# Patient Record
Sex: FEMALE
[De-identification: names, ages, dates, MRNs, and addresses within clinical notes are randomized per-mention and may not be internally consistent; named-entity substitution may affect disease eponyms.]

---

## 2018-01-05 ENCOUNTER — HOSPITAL ENCOUNTER (EMERGENCY)
Dept: HOSPITAL 31 - C.ER | Age: 48
Discharge: HOME | End: 2018-01-05
Payer: COMMERCIAL

## 2018-01-05 VITALS
HEART RATE: 76 BPM | SYSTOLIC BLOOD PRESSURE: 114 MMHG | TEMPERATURE: 98.7 F | DIASTOLIC BLOOD PRESSURE: 81 MMHG | RESPIRATION RATE: 18 BRPM

## 2018-01-05 VITALS — OXYGEN SATURATION: 100 %

## 2018-01-05 VITALS — BODY MASS INDEX: 24.9 KG/M2

## 2018-01-05 DIAGNOSIS — J45.909: Primary | ICD-10-CM

## 2018-01-05 NOTE — C.PDOC
History Of Present Illness


47 yr old female with PMHx of asthma, presents to the ER with complaints of 

coughing for the past 3 days, associated with subjective fever and runny nose. 

Patient states she has been using her nebulizer with no relief. States she felt 

more SOB prompting the visit. Denies travel, chest pain, nausea, vomiting, 

abdominal pain, neck pain or headache.


Time Seen by Provider: 01/05/18 07:21


Chief Complaint (Nursing): Cough, Cold, Congestion


History Per: Patient


History/Exam Limitations: no limitations


Onset/Duration Of Symptoms: Days (3)


Current Symptoms Are (Timing): Still Present





Past Medical History


Reviewed: Historical Data, Nursing Documentation, Vital Signs


Vital Signs: 


 Last Vital Signs











Temp  98.7 F   01/05/18 09:32


 


Pulse  76   01/05/18 09:32


 


Resp  18   01/05/18 09:32


 


BP  114/81   01/05/18 09:32


 


Pulse Ox  100   01/05/18 09:32














- Medical History


PMH: Asthma (DOES NOT USE INHALER OR TAKE MEDS), Diverticulitis, Gastritis, HTN

, Hypercholesterolemia, Osteoporosis


Surgical History: Endoscopy





- [facility] Procedures








ANESTH INJEC PERIPH NERV (03/04/15)


CYSTOSCOPY NEC (03/04/15)


IMPLANT OR REPLACEMENT OF PERIPHERAL NEUROSTIMULATOR LEAD(S) (06/11/15)


INJECT/INFUSE NEC (07/29/14)


INSERT OR REPLACE OF OTH NEUROSTIMULATOR PULSE GENERATOR (06/11/15)


OPEN ROBOTIC ASSISTED PROCEDURE (03/04/15)


OTH LYSIS-PERITONEAL ADHES (03/04/15)


OTHER AND UNSPECIFIED TOTAL ABDOMINAL HYSTERECTOMY (03/04/15)


REMOVE BOTH FALLOP TUBES (03/04/15)


SUTURE BLADDER LACERAT (03/04/15)


UTERINE SUSPENSION NEC (03/04/15)








Family History: States: No Known Family Hx





- Social History


Hx Tobacco Use: No


Hx Alcohol Use: Yes


Hx Substance Use: No





- Immunization History


Hx Tetanus Toxoid Vaccination: No


Hx Influenza Vaccination: Yes


Hx Pneumococcal Vaccination: No





Review Of Systems


Except As Marked, All Systems Reviewed And Found Negative.


Constitutional: Positive for: Fever (subjective)


ENT: Positive for: Nose Discharge (runny nose)


Cardiovascular: Negative for: Chest Pain


Respiratory: Positive for: Cough, Shortness of Breath


Gastrointestinal: Negative for: Nausea, Vomiting, Abdominal Pain


Musculoskeletal: Negative for: Neck Pain


Neurological: Negative for: Headache





Physical Exam





- Physical Exam


Appears: Non-toxic, No Acute Distress


Skin: Warm, Dry, No Rash


Head: Atraumatic, Normacephalic


Eye(s): bilateral: Normal Inspection, PERRL, EOMI


Ear(s): Bilateral: Normal


Oral Mucosa: Moist


Throat: Normal, No Erythema, No Exudate, No Drooling


Neck: Normal, Normal ROM, Supple


Cardiovascular: Rhythm Regular, No Friction Rub, No Murmur


Respiratory: Normal Breath Sounds, No Rales, No Rhonchi, No Stridor, No Wheezing

, Other (constant coughing, no retractions)


Gastrointestinal/Abdominal: Normal Exam, Soft, No Tenderness


Extremity: Normal ROM, No Swelling


Neurological/Psych: Oriented x3, Normal Speech, Normal Motor


Gait: Steady





ED Course And Treatment


O2 Sat by Pulse Oximetry: 100 (RA)


Pulse Ox Interpretation: Normal





- Other Rad


  ** CXR


X-Ray: Viewed By Me, Read By Radiologist


Interpretation: HISTORY:  COMPARISON:  01/05/2018 please.  TECHNIQUE:  Chest PA 

and lateral.  FINDINGS:  LINES AND TUBES:  None.  LUNG AND PLEURA:  The lungs 

are well inflated and clear.  HEART AND MEDIASTINUM:  The heart is not 

enlarged. The hilar and mediastinal contours are within normal limits.  

SKELETAL STRUCTURES:  The bony structures are within normal limits for the 

patient's age.  VISUALIZED UPPER ABDOMEN:  Normal.  OTHER FINDINGS:  None.  

IMPRESSION:  No active pulmonary disease.





Medical Decision Making


Medical Decision Making: 


PLAN:


* CXR


* Albuterol INH


* Motrin PO 


* Prednisone PO 


* Phenergan PO 





NOTE: 


On re-exam, the patient reports improvement of symptoms. Lungs are CTA, heart 

is RRR, vitals are WNL's. Ambulatory in the ED with steady gait. 





Disposition





- Disposition


Referrals: 


[facility] [Outside]


Disposition: HOME/ ROUTINE


Disposition Time: 09:02


Condition: GOOD


Additional Instructions: 


Follow up with the medical doctor within 1-2 days. Return if worsened. 


Prescriptions: 


Benzonatate [Tessalon Perles] 200 mg PO TID PRN #21 sgl


 PRN Reason: Cough


Ibuprofen [Motrin] 1 tab PO TID PRN #30 tab


 PRN Reason: Pain


predniSONE [Prednisone] 20 mg PO BID #10 tab


Instructions:  Acute Bronchitis (ED)


Forms:  [facility] (English), Work Excuse


Print Language: Paraguayan





- Clinical Impression


Clinical Impression: 


 Bronchitis, Asthmatic bronchitis








- PA / NP / Resident Statement


MD/DO has reviewed & agrees with the documentation as recorded.





- Scribe Statement


The provider has reviewed the documentation as recorded by the Scribe


Lisa Mera





All medical record entries made by the Scribe were at my direction and 

personally dictated by me. I have reviewed the chart and agree that the record 

accurately reflects my personal performance of the history, physical exam, 

medical decision making, and the department course for this patient. I have 

also personally directed, reviewed, and agree with the discharge instructions 

and disposition.

## 2018-01-05 NOTE — RAD
HISTORY:



COMPARISON:

01/05/2018 please.



TECHNIQUE:

Chest PA and lateral



FINDINGS:



LINES AND TUBES:

None. 



LUNG AND PLEURA:

The lungs are well inflated and clear. 



HEART AND MEDIASTINUM:

The heart is not enlarged. The hilar and mediastinal contours are 

within normal limits.



SKELETAL STRUCTURES:

The bony structures are within normal limits for the patient's age.



VISUALIZED UPPER ABDOMEN:

Normal.



OTHER FINDINGS:

None.



IMPRESSION:

No active pulmonary disease.

## 2018-07-11 ENCOUNTER — HOSPITAL ENCOUNTER (EMERGENCY)
Dept: HOSPITAL 31 - C.ER | Age: 48
Discharge: HOME | End: 2018-07-11
Payer: COMMERCIAL

## 2018-07-11 VITALS — HEART RATE: 88 BPM | TEMPERATURE: 98.3 F | SYSTOLIC BLOOD PRESSURE: 106 MMHG | DIASTOLIC BLOOD PRESSURE: 78 MMHG

## 2018-07-11 VITALS — OXYGEN SATURATION: 100 %

## 2018-07-11 VITALS — BODY MASS INDEX: 25 KG/M2

## 2018-07-11 VITALS — RESPIRATION RATE: 18 BRPM

## 2018-07-11 DIAGNOSIS — K57.32: Primary | ICD-10-CM

## 2018-07-11 LAB
ALBUMIN SERPL-MCNC: 4.5 G/DL (ref 3.5–5)
ALBUMIN/GLOB SERPL: 1.3 {RATIO} (ref 1–2.1)
ALT SERPL-CCNC: 27 U/L (ref 9–52)
AST SERPL-CCNC: 25 U/L (ref 14–36)
BASOPHILS # BLD AUTO: 0 K/UL (ref 0–0.2)
BASOPHILS NFR BLD: 0.3 % (ref 0–2)
BILIRUB UR-MCNC: NEGATIVE MG/DL
BUN SERPL-MCNC: 9 MG/DL (ref 7–17)
CALCIUM SERPL-MCNC: 10.1 MG/DL (ref 8.6–10.4)
EOSINOPHIL # BLD AUTO: 0.1 K/UL (ref 0–0.7)
EOSINOPHIL NFR BLD: 0.9 % (ref 0–4)
ERYTHROCYTE [DISTWIDTH] IN BLOOD BY AUTOMATED COUNT: 13.1 % (ref 11.5–14.5)
GFR NON-AFRICAN AMERICAN: > 60
GLUCOSE UR STRIP-MCNC: NORMAL MG/DL
HGB BLD-MCNC: 12 G/DL (ref 11–16)
LEUKOCYTE ESTERASE UR-ACNC: (no result) LEU/UL
LIPASE: 100 U/L (ref 23–300)
LYMPHOCYTES # BLD AUTO: 3.1 K/UL (ref 1–4.3)
LYMPHOCYTES NFR BLD AUTO: 29 % (ref 20–40)
MCH RBC QN AUTO: 31 PG (ref 27–31)
MCHC RBC AUTO-ENTMCNC: 33.9 G/DL (ref 33–37)
MCV RBC AUTO: 91.6 FL (ref 81–99)
MONOCYTES # BLD: 1.2 K/UL (ref 0–0.8)
MONOCYTES NFR BLD: 10.9 % (ref 0–10)
NEUTROPHILS # BLD: 6.3 K/UL (ref 1.8–7)
NEUTROPHILS NFR BLD AUTO: 58.9 % (ref 50–75)
NRBC BLD AUTO-RTO: 0 % (ref 0–2)
PH UR STRIP: 6 [PH] (ref 5–8)
PLATELET # BLD: 309 K/UL (ref 130–400)
PMV BLD AUTO: 7.9 FL (ref 7.2–11.7)
PROT UR STRIP-MCNC: NEGATIVE MG/DL
RBC # BLD AUTO: 3.86 MIL/UL (ref 3.8–5.2)
RBC # UR STRIP: NEGATIVE /UL
SP GR UR STRIP: 1 (ref 1–1.03)
SQUAMOUS EPITHIAL: < 1 /HPF (ref 0–5)
UROBILINOGEN UR-MCNC: NORMAL MG/DL (ref 0.2–1)
WBC # BLD AUTO: 10.7 K/UL (ref 4.8–10.8)

## 2018-07-11 PROCEDURE — 96374 THER/PROPH/DIAG INJ IV PUSH: CPT

## 2018-07-11 PROCEDURE — 81001 URINALYSIS AUTO W/SCOPE: CPT

## 2018-07-11 PROCEDURE — 83690 ASSAY OF LIPASE: CPT

## 2018-07-11 PROCEDURE — 80053 COMPREHEN METABOLIC PANEL: CPT

## 2018-07-11 PROCEDURE — 74177 CT ABD & PELVIS W/CONTRAST: CPT

## 2018-07-11 PROCEDURE — 99285 EMERGENCY DEPT VISIT HI MDM: CPT

## 2018-07-11 PROCEDURE — 85025 COMPLETE CBC W/AUTO DIFF WBC: CPT

## 2018-07-11 NOTE — C.PDOC
History Of Present Illness


47-year-old female presents to the ER for evaluation of abdominal pain 

associated with multiple episodes of watery non-bloody diarrhea that began 

yesterday. Pain is described as generalized and crampy in nature. No associated 

nausea or vomiting. She does report loss of appetite. Otherwise patient denies 

any fever, chills, dysuria, frequency, back pain, or other associated symptoms. 

Patient states pain and discomfort continued today, prompting concern.





Time Seen by Provider: 07/11/18 18:57


Chief Complaint (Nursing): Abdominal Pain


History Per: Patient


History/Exam Limitations: no limitations


Onset/Duration Of Symptoms: Days (x2)


Current Symptoms Are (Timing): Still Present





Past Medical History


Reviewed: Historical Data, Nursing Documentation, Vital Signs


Vital Signs: 


 Last Vital Signs











Temp  98.3 F   07/11/18 21:25


 


Pulse  88   07/11/18 21:25


 


Resp  18   07/11/18 21:25


 


BP  106/78   07/11/18 21:25


 


Pulse Ox  100   07/11/18 21:58














- Medical History


PMH: Asthma (DOES NOT USE INHALER OR TAKE MEDS), Diverticulitis, Gastritis, HTN

, Hypercholesterolemia, Osteoporosis


   Denies: Colonic Polyps, Chronic Kidney Disease


Surgical History: Endoscopy





- [facility] Procedures








ANESTH INJEC PERIPH NERV (03/04/15)


CYSTOSCOPY NEC (03/04/15)


IMPLANT OR REPLACEMENT OF PERIPHERAL NEUROSTIMULATOR LEAD(S) (06/11/15)


INJECT/INFUSE NEC (07/29/14)


INSERT OR REPLACE OF OTH NEUROSTIMULATOR PULSE GENERATOR (06/11/15)


OPEN ROBOTIC ASSISTED PROCEDURE (03/04/15)


OTH LYSIS-PERITONEAL ADHES (03/04/15)


OTHER AND UNSPECIFIED TOTAL ABDOMINAL HYSTERECTOMY (03/04/15)


REMOVE BOTH FALLOP TUBES (03/04/15)


SUTURE BLADDER LACERAT (03/04/15)


UTERINE SUSPENSION NEC (03/04/15)








Family History: States: Unknown Family Hx





- Social History


Hx Tobacco Use: No


Hx Alcohol Use: Yes


Hx Substance Use: No





- Immunization History


Hx Tetanus Toxoid Vaccination: No


Hx Influenza Vaccination: Yes


Hx Pneumococcal Vaccination: No





Review Of Systems


Except As Marked, All Systems Reviewed And Found Negative.


Constitutional: Negative for: Fever, Chills


Gastrointestinal: Positive for: Abdominal Pain, Diarrhea, Other (Loss of 

appetite).  Negative for: Nausea, Vomiting


Genitourinary: Negative for: Dysuria, Frequency


Musculoskeletal: Negative for: Back Pain





Physical Exam





- Physical Exam


Appears: No Acute Distress, Other (Appears uncomfortable)


Skin: Normal Color, Warm, Dry


Head: Atraumatic, Normacephalic


Eye(s): bilateral: Normal Inspection, PERRL, EOMI


Nose: Normal


Oral Mucosa: Moist


Neck: Normal ROM, Supple


Chest: Symmetrical


Cardiovascular: Rhythm Regular, No Murmur


Respiratory: Normal Breath Sounds, No Accessory Muscle Use


Gastrointestinal/Abdominal: Soft, Tenderness (mild tenderness to the RUQ and 

epigastrium, with increased tenderness of the RLQ with guarding), Guarding (RLQ)

, No Rebound


Back: Normal Inspection, No CVA Tenderness


Extremity: Bilateral: Atraumatic, Normal Color And Temperature, Normal ROM


Neurological/Psych: Oriented x3, Normal Speech, Normal Cranial Nerves





ED Course And Treatment





- Laboratory Results


Result Diagrams: 


 07/11/18 19:14





 07/11/18 19:14


Lab Interpretation: No Acute Changes


O2 Sat by Pulse Oximetry: 100 (RA)


Pulse Ox Interpretation: Normal





- CT Scan/US


  ** CT abd/pel


Other Rad Studies (CT/US): Read By Radiologist, Radiology Report Reviewed


CT/US Interpretation: EXAM:  CT Abdomen and Pelvis With Intravenous Contrast.  

EXAM DATE/TIME:  7/11/2018 7:01 PM.  CLINICAL HISTORY:  47 years old, female; 

Pain; Abdominal pain; Generalized; Additional info: Abd pain.  TECHNIQUE:  

Axial computed tomography images of the abdomen and pelvis with intravenous 

contrast.  All CT scans at this facility use at least one of these dose 

optimization techniques: automated.  exposure control; mA and/or kV adjustment 

per patient size (includes targeted exams where dose is.  matched to clinical 

indication); or iterative reconstruction.  Coronal and sagittal reformatted 

images were created and reviewed.  CONTRAST:  100 ml of OMNIPAQUE 300 

administered intravenously.  COMPARISON:  CT - ABD PELVIS IV CONTRAST ONLY 2016- 03-16 20:55.  FINDINGS:  Limitations: Motion artifact - mild to moderate.  

Lower thorax: No acute findings.  ABDOMEN:  Liver: Fatty infiltration.  

Gallbladder and bile ducts: No calcified stones. No ductal dilation.  Pancreas: 

Normal. No ductal dilation.  Spleen: No splenomegaly.  Adrenals: No mass.  

Kidneys and ureters: Normal. No hydronephrosis.  Stomach and bowel: Few 

scattered diverticula within colon. Mild mural thickening short segment of.  

cecum. Mild stranding within adjacent fat. No obstruction.  Appendix: Normal 

caliber. No inflammation.  PELVIS:  Bladder: Unremarkable as visualized.  

Reproductive: Hysterectomy.  ABDOMEN and PELVIS:  Intraperitoneal space: 

Normal. No free air. No significant fluid collection.  Bones/joints: No acute 

fracture. No dislocation.  Soft tissues: Breast implants.  Vasculature: Minimal 

atherosclerotic disease. No aneurysm.  Lymph nodes: No enlarged lymph nodes.  

IMPRESSION:  Findings compatible with acute diverticulitis of cecum. Recommend 

endoscopy following resolution.


Reevaluation Time: 22:01


Reassessment Condition: Improved





Medical Decision Making


Medical Decision Making: 


Initial Impression: Right flank pain





Time: 19:01


Initial Plan:


--Blood work


--Urinalysis


--2 mg IV Morphine


--CT A/P with PO & IV contrast








Disposition


Counseled Patient/Family Regarding: Studies Performed, Diagnosis, Need For 

Followup, Rx Given





- Disposition


Referrals: 


[facility] [Outside]


Disposition: HOME/ ROUTINE


Disposition Time: 22:04


Condition: STABLE


Prescriptions: 


Ciprofloxacin [Cipro] 1 tab PO BID #14 tab


Instructions:  Low Fiber Diet, Diverticulitis


Forms:  [facility] (Vietnamese)


Print Language: Trinidadian





- Clinical Impression


Clinical Impression: 


 Diverticulitis








- Scribe Statement


The provider has reviewed the documentation as recorded by the Isaias Mensah





Provider Attestation: 





All medical record entries made by the Isaias were at my direction and 

personally dictated by me. I have reviewed the chart and agree that the record 

accurately reflects my personal performance of the history, physical exam, 

medical decision making, and the department course for this patient. I have 

also personally directed, reviewed, and agree with the discharge instructions 

and disposition.

## 2018-07-12 NOTE — CT
Date of service: 



07/11/2018



PROCEDURE:  CT Abdomen and Pelvis without intravenous contrast



HISTORY:

Abdominal pain 



COMPARISON:

CT abdomen and pelvis dated 03/16/2016



TECHNIQUE:

Multiple contiguous axial images were performed through the abdomen 

and pelvis with the use of intravenous contrast.  Subsequently, 

sagittal and coronal reformatted images were. 



Radiation dose:



Total exam DLP = 326 mGy-cm.



This CT exam was performed using one or more of the following dose 

reduction techniques: Automated exposure control, adjustment of the 

mA and/or kV according to patient size, and/or use of iterative 

reconstruction technique.



FINDINGS:



LOWER THORAX:

Atelectasis at the lung bases. 



LIVER:

Fatty infiltration of the liver. 



GALLBLADDER AND BILE DUCTS:

Unremarkable. 



PANCREAS:

Unremarkable. No gross lesion or ductal dilatation.



SPLEEN:

Unremarkable. 



ADRENALS:

Unremarkable. No mass. 



KIDNEYS AND URETERS:

Unremarkable. No hydronephrosis. No solid mass. 



VASCULATURE:

Atherosclerotic disease in the aorta. 



BOWEL:

Few scattered diverticuli within the colon.  Mild mural thickening of 

a short segment of the cecum.  Mild stranding in the adjacent fat.



APPENDIX:

Unremarkable. Normal appendix. 



PERITONEUM:

Unremarkable. No free fluid. No free air. 



LYMPH NODES:

Unremarkable. No enlarged lymph nodes. 



BLADDER:

Unremarkable. 



REPRODUCTIVE:

Hysterectomy. 



BONES:

No acute fracture. 



OTHER FINDINGS:

Mild to moderate motion artifact. Breast implants. 



IMPRESSION:

Findings concerning for acute diverticulitis of the cecum. Clinical 

correlation.  Recommend endoscopy following resolution.



Additional findings as above.



These findings were preliminarily reported at 9:53 p.m. on 07/11/2018 

by Dr. Max Villanueva from [facility].

## 2018-09-12 ENCOUNTER — HOSPITAL ENCOUNTER (EMERGENCY)
Dept: HOSPITAL 31 - C.ER | Age: 48
Discharge: HOME | End: 2018-09-12
Payer: COMMERCIAL

## 2018-09-12 VITALS
HEART RATE: 66 BPM | SYSTOLIC BLOOD PRESSURE: 109 MMHG | OXYGEN SATURATION: 100 % | DIASTOLIC BLOOD PRESSURE: 75 MMHG | TEMPERATURE: 98.8 F | RESPIRATION RATE: 18 BRPM

## 2018-09-12 VITALS — BODY MASS INDEX: 25 KG/M2
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
## 2018-09-12 DIAGNOSIS — S91.311A: Primary | ICD-10-CM

## 2018-09-12 NOTE — C.PDOC
History Of Present Illness





48 yo female come in accompanied by daughter for evaluation of Right foot 

laceration sustained early today around 5PM "walking outside by garbage". Pt 

admits, cleaned wound at home," noted still bleeding" . Otherwise, pt denies 

fever, chills, deformity to Right foot, weakness, sensory or vascular deficits. 

Ambulate to Ed for evaluation, not in any apparent distress.


Time Seen by Provider: 09/12/18 21:39


History Per: Patient


Onset/Duration Of Symptoms: Sudden Onset





Past Medical History


Reviewed: Historical Data, Nursing Documentation, Vital Signs


Vital Signs: 


 Last Vital Signs











Temp  98.8 F   09/12/18 22:12


 


Pulse  66   09/12/18 22:12


 


Resp  18   09/12/18 22:12


 


BP  109/75   09/12/18 22:12


 


Pulse Ox  100   09/12/18 22:12














- Medical History


PMH: Asthma (DOES NOT USE INHALER OR TAKE MEDS), Diverticulitis, Gastritis, HTN

, Hypercholesterolemia, Osteoporosis


   Denies: Colonic Polyps, Chronic Kidney Disease


Surgical History: Endoscopy





- [facility] Procedures








ANESTH INJEC PERIPH NERV (03/04/15)


CYSTOSCOPY NEC (03/04/15)


IMPLANT OR REPLACEMENT OF PERIPHERAL NEUROSTIMULATOR LEAD(S) (06/11/15)


INJECT/INFUSE NEC (07/29/14)


INSERT OR REPLACE OF OTH NEUROSTIMULATOR PULSE GENERATOR (06/11/15)


OPEN ROBOTIC ASSISTED PROCEDURE (03/04/15)


OTH LYSIS-PERITONEAL ADHES (03/04/15)


OTHER AND UNSPECIFIED TOTAL ABDOMINAL HYSTERECTOMY (03/04/15)


REMOVE BOTH FALLOP TUBES (03/04/15)


SUTURE BLADDER LACERAT (03/04/15)


UTERINE SUSPENSION NEC (03/04/15)








Family History: States: Unknown Family Hx





- Social History


Hx Tobacco Use: No


Hx Alcohol Use: Yes


Hx Substance Use: No





- Immunization History


Hx Tetanus Toxoid Vaccination: No


Hx Influenza Vaccination: Yes


Hx Pneumococcal Vaccination: No





Review Of Systems


Except As Marked, All Systems Reviewed And Found Negative.


Constitutional: Negative for: Fever, Chills


Musculoskeletal: Positive for: Foot Pain


Skin: Positive for: Lesions


Neurological: Negative for: Weakness, Numbness





Physical Exam





- Physical Exam


Appears: Well, Non-toxic, No Acute Distress


Skin: Normal Color, Warm, Other ((+) 2cm C-shape cutaneous laceration over 

lateral dorsal aspect Right ,mild bloody oozing noted. NO wound FB. )


Extremity: Normal ROM (Rightfoot, no neurovascular deficits distally), 

Tenderness (mild over lac area), No Calf Tenderness, Capillary Refill (less 

than 2sec to Right foot), No Deformity, No Swelling


Neurological/Psych: Oriented x3, Normal Speech, Normal Motor, Normal Sensation, 

Normal Reflexes





ED Course And Treatment


Progress Note: On re-eval, pt is afebrile, hemodynamicaly stable.  NOn-toxic.  

Ambulatory in ED with stable gait.  Right foot: laceration repaired w/sutures 

without difficulty. FAROM, no neurovascular deficits.  Pt was offered imaging 

to r/o FB or fx-refused.  Tetanus given.  Pt advised on wound care.  ref. to F/

u with PMD in 2 days for wound check





Laceration





- Laceration Repair


  ** Right foot


Wound Length (In cm): 2cm


Description Of Wound: Irregular (C-shape, cutaneous)


Wound Cleansed With: Betadine


Anesthesia: Lidocaine 2%


Wound Examination: Irrigated With Saline, No FB With Wound Exploration, No 

Tendon Injury With Wound Exploration


Wound Closure: Suture (#5)


Suture Technique And Material Used: Interrupted, Nylon (5-0)


Wound Complexity: Simple





Disposition


Counseled Patient/Family Regarding: Diagnosis, Need For Followup





- Disposition


Referrals: 


[facility] [Outside]


Disposition: HOME/ ROUTINE


Disposition Time: 22:02


Condition: STABLE


Additional Instructions: 


Avoid prolong walking for 1 week


Keep wound clean, dry, avoid prolong water exposure, cover Right foot with 

plastic bag during the shower


Suture removal in 10 days


Follow up with PMD in 2 days for wound check as need


return to ED at any time if any worsening or any sign of infection


Instructions:  Laceration Repair With Stitches (DC)


Forms:  Work Excuse


Print Language: Amharic





- Clinical Impression


Clinical Impression: 


 Laceration of foot

## 2018-09-27 ENCOUNTER — HOSPITAL ENCOUNTER (EMERGENCY)
Dept: HOSPITAL 14 - H.ER | Age: 48
Discharge: HOME | End: 2018-09-27
Payer: SELF-PAY

## 2018-09-27 VITALS
RESPIRATION RATE: 18 BRPM | TEMPERATURE: 98.4 F | SYSTOLIC BLOOD PRESSURE: 124 MMHG | DIASTOLIC BLOOD PRESSURE: 71 MMHG | OXYGEN SATURATION: 98 % | HEART RATE: 68 BPM

## 2018-09-27 VITALS — BODY MASS INDEX: 25 KG/M2

## 2018-09-27 DIAGNOSIS — I10: ICD-10-CM

## 2018-09-27 DIAGNOSIS — M79.671: ICD-10-CM

## 2018-09-27 DIAGNOSIS — E78.00: ICD-10-CM

## 2018-09-27 DIAGNOSIS — Z47.89: Primary | ICD-10-CM

## 2018-09-27 NOTE — RAD
Date of service: 



09/27/2018



PROCEDURE:  Right Foot Radiographs.



HISTORY:

 pain 



COMPARISON:

None.



FINDINGS:



BONES:

No acute fracture. 



JOINTS:

Normal. 



SOFT TISSUES:

Normal. 



OTHER FINDINGS:

None.



IMPRESSION:

No demonstrated fracture or dislocation

## 2018-09-27 NOTE — CP.PCM.CON
History of Present Illness





- History of Present Illness


History of Present Illness: 





podiatry consult notes for attending Dr. Montoya:





49 y/o F ptient with PMH of Asthma, HTN, Osteoporosis seen and evaluated at the 

ED for persistent right foot pain, worse since yesterday. Patient states on 

she was seen in [place] ED, after sustaining a laceration to the right foot, by 

that time patient refused x-ray. She did have stitches placed by the ED doctor. 

patient states that she had a prescription for Abx by mouth and she is still 

using it. Patient states that she recieved also a tetanus shot. Patient states 

that she returned to the ED on  due to continuous pain. patient states that 

she did the X-ray that time, She states that they found a fracture at her 5th 

metatarsal bone, Patient states that she was splinted. She states that last 

Monday she went to podiatry clinic and the foot was re-splinted again. Patient 

states that yesterday she noticed that her foot was more painful and toes 

appeared swollen. She denies any numbness, weakness, tingling, or burning 

sensation in her feet. She denies any recent F/N/V/C or SOB. She denies any 

other pedal complaint at this time. 





PMH: Asthma, HTN, Osteoporosis


PSH: Hysterectomy, .


Allergies: NKDA.


Social Hx: Denies Smoking, Denies EtOH use or illicit drug use.




















Review of Systems





- Review of Systems


Review of Systems: 





As per HPI





Past Patient History





- Infectious Disease


Hx of Infectious Diseases: None





- Past Medical History & Family History


Past Medical History?: Yes





- Past Social History


Alcohol: Social


Drugs: Denies





- CARDIAC


Hx Hypercholesterolemia: Yes


Hx Hypertension: Yes





- PULMONARY


Hx Asthma: Yes (DOES NOT USE INHALER OR TAKE MEDS)





- NEUROLOGICAL


Hx Neurological Disorder: No





- HEENT


Hx HEENT Problems: No





- RENAL


Hx Chronic Kidney Disease: No





- ENDOCRINE/METABOLIC


Hx Endocrine Disorders: No





- HEMATOLOGICAL/ONCOLOGICAL


Hx Blood Disorders: No


Hx Blood Transfusions: No





- INTEGUMENTARY


Hx Dermatological Problems: No





- MUSCULOSKELETAL/RHEUMATOLOGICAL


Hx Osteoporosis: Yes





- GASTROINTESTINAL


Hx Diverticulitis: Yes


Hx Gastritis: Yes





- GENITOURINARY/GYNECOLOGICAL


Hx Genitourinary Disorders: Yes


Hx Incontinence: Yes (STRESS)





- PSYCHIATRIC


Hx Physical Abuse: No


Hx Substance Use: No





- SURGICAL HISTORY


Hx Hysterectomy: Yes





- ANESTHESIA


Hx Anesthesia: Yes


Hx Anesthesia Reactions: Yes (VOMITTING)


Hx Malignant Hyperthermia: No





Meds


Home Medications: 


                              Home Medication List











 Medication  Instructions  Recorded  Confirmed  Type


 


Meloxicam [Mobic] 1 - 2 mg PO DAILY PRN #20 tab 18  Rx











Allergies/Adverse Reactions: 


                                    Allergies











Allergy/AdvReac Type Severity Reaction Status Date / Time


 


No Known Allergies Allergy   Verified 18 10:08














Physical Exam





- Constitutional


Appears: Well, Non-toxic, No Acute Distress





- Head Exam


Head Exam: ATRAUMATIC, NORMOCEPHALIC





- Extremities Exam


Additional comments: 





Right LE focused exam:





Vasc: DP/PT 2/4, cap refill <3 seconds to all digits, temp gradient warm to cool

 from proximal to distal





Neuro: Gross and protective sensation are grossly intact





Derm: No open lesions, no clinical signs of active infections. Healed wound at 

the lateral side of the right foot at the level of the 5th metatarsal bone base.

 the wound noticed to be clean with no drainage, no signs of dehesience and no 

signs of active bacterial infection.





MSK: Pain on palpation of the right foot lateral side. Muscle power 5/5 in all 

groups. Pain with both passive and active ROM of the right foot specially with 

inversion/eversion.
































- Neurological Exam


Neurological exam: Alert, Oriented x3





- Psychiatric Exam


Psychiatric exam: Normal Affect, Normal Mood





Results





- Vital Signs


Recent Vital Signs: 


                                Last Vital Signs











Temp  98.7 F   18 17:06


 


Pulse  81   18 17:06


 


Resp  16   18 17:06


 


BP  143/81   18 17:06


 


Pulse Ox  97   18 18:31














Assessment & Plan





- Assessment and Plan (Free Text)


Assessment: 





49 y/o F patient seen and evaluated for right foot pain 


Plan: 








Patient seen and evaluated at the bedside.


Plan discussed with the attending Dr. Montoya


 number ; [id number] used for translation.


Chart, labs and vitals reviewed;  afebrile


X-Ray right foot reviewed: No apparent fractures or osseous deformities


Venous duplex: No evidence of DVT.


Modified Bundy compression applied to the patient Right LE.


Dispensed Surgical shoe for the Right LE.


Patient instructed to NWB to the right LE and to ambulate using crutches.


Patient educated the RICE protocol.


Patient expressed verbal understanding.


Patient will follow up in the podiatry clinic at [facility] with Dr. Montoya.





- Date & Time


Date: 18 (t)


Time: 20:53

## 2018-09-27 NOTE — ED PDOC
Lower Extremity Pain/Injury


Chief Complaint (Provider): Right foot pain


History Per:  ( #[id number])


History/Exam Limitations: no limitations


Onset/Duration Of Symptoms: Persistent


Current Symptoms Are (Timing): Still Present


Additional Complaint(s): 


47 y/o female presents to the ED for persistent right foot pain, worse since 

yesterday. Patient states on 9/12 she was seen in [facility] ED, after sustaining a 

laceration to the right foot, and refused x-ray at that time. She did have 

stitches placed. Patient reports returning to the ED on 9/21 and had x-ray due 

to continued pain, found to have a fracture, and was splinted. Monday she went 

to podiatry clinic and the foot was re-splinted. Yesterday patient noticed that 

her foot was more painful and toes appeared swollen. Otherwise she denies any 

numbness, weakness, tingling, fever, or new trauma. 








<Edmar Cronin - Last Filed: 09/27/18 20:59>





<Onofre Mattson III - Last Filed: 10/02/18 13:40>


Time Seen by Provider: 09/27/18 17:13


Chief Complaint (Nursing): Lower Extremity Problem/Injury





Supervising Attending Note





- Attestation:


I have reviewed all pertinent clinical information, including history, physical 

exam and plan: Yes





<Onofre Mattson III - Last Filed: 10/02/18 13:40>





Past Medical History


Reviewed: Historical Data, Nursing Documentation, Vital Signs


Vital Signs: 





                                Last Vital Signs











Temp  98.7 F   09/27/18 17:06


 


Pulse  81   09/27/18 17:06


 


Resp  16   09/27/18 17:06


 


BP  143/81   09/27/18 17:06


 


Pulse Ox  97   09/27/18 17:06














- Medical History


PMH: Asthma (DOES NOT USE INHALER OR TAKE MEDS), Diverticulitis, Gastritis, HTN,

Hypercholesterolemia, Osteoporosis


   Denies: Colonic Polyps, Chronic Kidney Disease





- Surgical History


Surgical History: Endoscopy





- Family History


Family History: States: No Known Family Hx





- Social History


Current smoker - smoking cessation education provided: No


Alcohol: Social


Drugs: Denies





- Immunization History


Hx Tetanus Toxoid Vaccination: No


Hx Influenza Vaccination: Yes


Hx Pneumococcal Vaccination: No





<Edmar Cronin - Last Filed: 09/27/18 20:59>


Vital Signs: 





                                Last Vital Signs











Temp  98.4 F   09/27/18 21:20


 


Pulse  68   09/27/18 21:20


 


Resp  18   09/27/18 21:20


 


BP  124/71   09/27/18 21:20


 


Pulse Ox  98   09/27/18 21:20














<Onofre Mattson III - Last Filed: 10/02/18 13:40>





- Home Medications


Home Medications: 


                                Ambulatory Orders











 Medication  Instructions  Recorded


 


RX: Lisinopril [Zestril] 10 mg PO DAILY 09/12/16


 


Cephalexin [Keflex] 500 mg PO BID #14 capsule 09/21/18


 


RX: Naproxen 375 mg PO BID PRN #20 tablet 09/21/18


 


Meloxicam [Mobic] 1 - 2 mg PO DAILY PRN #20 tab 09/27/18














- Allergies


Allergies/Adverse Reactions: 


                                    Allergies











Allergy/AdvReac Type Severity Reaction Status Date / Time


 


No Known Allergies Allergy   Verified 09/21/18 10:08














Review of Systems


ROS Statement: Except As Marked, All Systems Reviewed And Found Negative


Constitutional: Negative for: Fever, Chills


Musculoskeletal: Positive for: Foot Pain (right), Other (right toes +swelling)


Neurological: Negative for: Weakness, Numbness, Incoordination, Other (tingling)





<Edmar Cronin - Last Filed: 09/27/18 20:59>





Physical Exam





- Reviewed


Nursing Documentation Reviewed: Yes


Vital Signs Reviewed: Yes





- Physical Exam


Appears: Positive for: Well, Non-toxic, No Acute Distress


Head Exam: Positive for: ATRAUMATIC, NORMOCEPHALIC


Skin: Positive for: Normal Color, Warm, Dry


Eye Exam: Positive for: Normal appearance


Pulses-Dorsalis Pedis (L): 2+


Pulses-Dorsalis Pedis (R): 2+


Extremity: Positive for: Normal ROM (able to actively move all toes), Capillary 

Refill (less than 2 sec), Swelling (minimal swelling to toes), Other (RLE in 

posterior orthoglass splint; After removal, distal pulses are 2+; Distal 

sensation intact and equal bilaterally; No skin changes)


Neurologic/Psych: Positive for: Alert, Oriented (x3).  Negative for: 

Motor/Sensory Deficits





<Edmar Cronin - Last Filed: 09/27/18 20:59>





- ECG


O2 Sat by Pulse Oximetry: 97 (RA)


Pulse Ox Interpretation: Normal





- Other Rad


  ** X-ray foot


X-Ray: Read By Radiologist


X-Ray Interpretation: No demonstrated fracture or dislocation





- Progress


ED Course And Treament: 





1815


Podiatry resident at bedside, interviewing patient, and requests Doppler study 

of RLE.





1955


Doppler ultrasound: negative for DVT 





2035


Podiatry resident made aware of results, and returns to bedside to re-evaluate 

patient. Resident spoke with Dr. Montoya, and patient can be discharged home. 

Bundy dressing applied to foot. Follow up arrangements made with Dr. Montoya in 

the office. 





<Edmar Cronin - Last Filed: 09/27/18 20:59>





Medical Decision Making


Medical Decision Making: 





Impression: Right foot pain





Initial Plan:


--Right foot x-ray


--Percocet 1 tab PO





Paged podiatry resident for consult. 





 

--------------------------------------------------------------------------------


-----------------


Scribe Attestation:


Documented by Ela Fatima, acting as a scribe for Edmar Cronin PA-C.





Provider Scribe Attestation:


All medical record entries made by the Scribe were at my direction and 

personally dictated by me. I have reviewed the chart and agree that the record 

accurately reflects my personal performance of the history, physical exam, 

medical decision making, and the department course for this patient. I have also

 personally directed, reviewed, and agree with the discharge instructions and 

disposition.








<Edmar Cronin - Last Filed: 09/27/18 20:59>





Disposition





- Patient ED Disposition


Is Patient to be Admitted: No





- Disposition


Disposition: Routine/Home


Disposition Time: 20:35





<Edmar Cronin - Last Filed: 09/27/18 20:59>





<Onofre Mattson III - Last Filed: 10/02/18 13:40>





- Clinical Impression


Clinical Impression: 


 Foot fracture, Aftercare for cast or splint check or change








- Disposition


Referrals: 


Podiatry Clinic [Outside]


Condition: STABLE


Additional Instructions: 


FOLLOW UP WITH DR. MONTOYA ON MONDAY WITHOUT FAIL





CAROL SUN, thank you for letting us take care of you today. Your provider 

was Onofre Mattson III, DO and you were treated for RIGHT FOOT INJURY. The 

emergency medical care you received today was directed at your acute symptoms. 

If you were prescribed any medication, please fill it and take as directed. It 

may take several days for your symptoms to resolve. Return to the Emergency 

Department if your symptoms worsen, do not improve, or if you have any other 

problems.





Please contact your doctor or call one of the physicians/clinics you have been 

referred to that are listed on the Patient Visit Information form that is 

included in your discharge packet. Bring any paperwork you were given at 

discharge with you along with any medications you are taking to your follow up 

visit. Our treatment cannot replace ongoing medical care by a primary care 

provider outside of the emergency department.





Thank you for allowing the [facility] team to be part of your care today.








If you had an X-Ray or CT scan: A Radiologist will review the ED reading if any 

change in treatment is needed we will contact you.***





If you had a blood, urine, or wound culture: It will take several days for the 

results, if any change in treatment is needed we will contact you.***





If you had an STI test: It will take 48 hours for the results. Please call after

 1 week if you have not heard back.***


Prescriptions: 


Meloxicam [Mobic] 1 - 2 mg PO DAILY PRN #20 tab


 PRN Reason: Pain


Instructions:  How to Use Crutches, Foot Fracture (DC)


Forms:  [facility] (English)





- PA / NP / Resident Statement


MD/ has reviewed & agrees with the documentation as recorded.





<Edmar Cronin E - Last Filed: 09/27/18 20:59>

## 2018-09-28 NOTE — US
Date of service: 



09/27/2018



PROCEDURE:  Right lower extremity venous duplex Doppler. 



HISTORY:

pain







COMPARISON:

Not available.



TECHNIQUE:

Common femoral, superficial femoral, popliteal and posterior tibial 

veins were evaluated. Flow was assessed with color Doppler, 

compressibility, assessment of phasic flow and augmentation response. 



FINDINGS:



COMMON FEMORAL VEIN:

Unremarkable.



SUPERFICIAL FEMORAL VEIN:

Unremarkable.



POPLITEAL VEIN:

Unremarkable.



POSTERIOR TIBIAL VEIN:

Unremarkable.



OTHER FINDINGS:

None.



IMPRESSION:

No evidence of deep venous thrombosis in the right lower extremity.



The preliminary findings for this examination were reported by [facility] 

Radiology at 9/27/2018 at 7:41 p.m..  There is concurrence of this 

report with the preliminary findings.